# Patient Record
Sex: MALE
[De-identification: names, ages, dates, MRNs, and addresses within clinical notes are randomized per-mention and may not be internally consistent; named-entity substitution may affect disease eponyms.]

---

## 2020-02-28 ENCOUNTER — NURSE TRIAGE (OUTPATIENT)
Dept: OTHER | Facility: CLINIC | Age: 30
End: 2020-02-28

## 2020-02-28 NOTE — TELEPHONE ENCOUNTER
Reason for Disposition   Patient wants to be seen    Protocols used: COUGH-ADULT-[place]    Patient called the [facility] to report that he started with a cough, chills, fever, SOB with exertion, right ear pain, and nosebleeds on Sunday. Patient reports he has taken OTC medications, reports fever has resolved but cough and ear pain persist. Patient denies shortness of breath at rest, denies severe shortness of breath, denies chest pain, denies fainting, denies coughing up blood, reports productive mucous is yellow, denies fainting, denies ankle swelling, denies wheezing, denies severe coughing spells, denies lung disease history. Patient is requesting a prescription, writer instructed patient that the Nurse Line cannot prescribe medications, if he is seeking prescriptions, he needs to be evaluated by a physician. Writer instructed patient that he could be seen at a PCP office, convenience clinic or urgent care. Please do not respond to the triage nurse through this encounter. Any subsequent communication should be directly with the patient.

## 2022-04-06 ENCOUNTER — NURSE TRIAGE (OUTPATIENT)
Dept: OTHER | Facility: CLINIC | Age: 32
End: 2022-04-06

## 2022-04-06 NOTE — TELEPHONE ENCOUNTER
Subjective: Diarrhea from Friday until last night. Had burger Friday that was a little red inside. Was having stomach over the weekend intermittently. Now just having pressure/bloating in stomach. No diarrhea. Small streaks of blood when wiped. No blood in stool. Has happened over the years from time to time. Current Symptoms: bloating and pressure in abdomen    Onset: 6 days ago; unchanged    Associated Symptoms: n/a    Pain Severity: 2/10; pressure, bloatin; constant    Temperature: denies fever by unknown method    What has been tried: ASA, hemorrhoid cream    LMP: NA Pregnant: NA    Recommended disposition: Go to ED/UCC Now (Or to Office with PCP Approval)    Care advice provided, patient verbalizes understanding; denies any other questions or concerns; instructed to call back for any new or worsening symptoms. Patient/caller agrees to proceed to nearest [facility]     This triage is a result of a call to [address] [place] [address]. Please do not respond to the triage nurse through this encounter. Any subsequent communication should be directly with the patient.       Reason for Disposition   Constant abdominal pain lasting > 2 hours    Protocols used: [id number]

## 2022-05-09 PROBLEM — S05.8X9A EYE TRAUMA, SUPERFICIAL: Status: ACTIVE | Noted: 2022-05-09

## 2023-10-21 ENCOUNTER — HOSPITAL ENCOUNTER (OUTPATIENT)
Dept: RADIOLOGY | Facility: HOSPITAL | Age: 33
Discharge: HOME | End: 2023-10-21

## 2023-10-21 DIAGNOSIS — R07.9 CHEST PAIN, UNSPECIFIED: ICD-10-CM

## 2023-10-21 PROCEDURE — 75571 CT HRT W/O DYE W/CA TEST: CPT | Mod: LIO
